# Patient Record
Sex: MALE | URBAN - METROPOLITAN AREA
[De-identification: names, ages, dates, MRNs, and addresses within clinical notes are randomized per-mention and may not be internally consistent; named-entity substitution may affect disease eponyms.]

---

## 2020-12-28 ENCOUNTER — TELEPHONE (OUTPATIENT)
Dept: OTOLARYNGOLOGY | Facility: CLINIC | Age: 40
End: 2020-12-28

## 2020-12-28 ENCOUNTER — OFFICE VISIT (OUTPATIENT)
Dept: OTOLARYNGOLOGY | Facility: CLINIC | Age: 40
End: 2020-12-28
Payer: COMMERCIAL

## 2020-12-28 VITALS
HEIGHT: 68 IN | DIASTOLIC BLOOD PRESSURE: 60 MMHG | TEMPERATURE: 97 F | BODY MASS INDEX: 19.7 KG/M2 | WEIGHT: 130 LBS | SYSTOLIC BLOOD PRESSURE: 130 MMHG

## 2020-12-28 DIAGNOSIS — H92.12 OTORRHEA OF LEFT EAR: ICD-10-CM

## 2020-12-28 DIAGNOSIS — J30.9 ALLERGIC RHINITIS, UNSPECIFIED SEASONALITY, UNSPECIFIED TRIGGER: Primary | ICD-10-CM

## 2020-12-28 DIAGNOSIS — J34.2 DEVIATED NASAL SEPTUM: ICD-10-CM

## 2020-12-28 PROCEDURE — 3008F BODY MASS INDEX DOCD: CPT | Performed by: OTOLARYNGOLOGY

## 2020-12-28 PROCEDURE — 3078F DIAST BP <80 MM HG: CPT | Performed by: OTOLARYNGOLOGY

## 2020-12-28 PROCEDURE — 3075F SYST BP GE 130 - 139MM HG: CPT | Performed by: OTOLARYNGOLOGY

## 2020-12-28 PROCEDURE — 99203 OFFICE O/P NEW LOW 30 MIN: CPT | Performed by: OTOLARYNGOLOGY

## 2020-12-28 RX ORDER — AZELASTINE 1 MG/ML
2 SPRAY, METERED NASAL 2 TIMES DAILY
Qty: 1 BOTTLE | Refills: 0 | Status: SHIPPED | OUTPATIENT
Start: 2020-12-28

## 2020-12-28 RX ORDER — MONTELUKAST SODIUM 10 MG/1
10 TABLET ORAL NIGHTLY
Qty: 30 TABLET | Refills: 3 | Status: SHIPPED | OUTPATIENT
Start: 2020-12-28

## 2020-12-28 RX ORDER — OFLOXACIN 3 MG/ML
3 SOLUTION AURICULAR (OTIC) 3 TIMES DAILY
Qty: 1 BOTTLE | Refills: 0 | Status: SHIPPED | OUTPATIENT
Start: 2020-12-28

## 2020-12-28 NOTE — PROGRESS NOTES
Ken Maya is a 36year old male. Patient presents with:  Sinus Problem: Patient Presents with stuffy nose, ear congestion of left ear       HISTORY OF PRESENT ILLNESS  Presents with a history of chronic allergy issues.   Underwent allergy testing this y bleeding and easy bruising.            PHYSICAL EXAM    /60 (BP Location: Left arm, Patient Position: Sitting, Cuff Size: adult)   Temp 97.2 °F (36.2 °C) (Tympanic)   Ht 5' 8\" (1.727 m)   Wt 130 lb (59 kg)   BMI 19.77 kg/m²        Constitutional Norm weeks.        This note was prepared using [employer] [facility] [employer] voice recognition dictation software. As a result errors may occur. When identified these errors have been corrected.  While every attempt is made to correct errors during dictation discrepancies may

## 2021-01-18 ENCOUNTER — TELEPHONE (OUTPATIENT)
Dept: OTOLARYNGOLOGY | Facility: CLINIC | Age: 41
End: 2021-01-18

## 2021-01-18 NOTE — TELEPHONE ENCOUNTER
, pt seen on 12/28 for otorrhea of left ear, per pt he did not start ear drops until January 8, per pt states ear is feeling better no more drainage or pain.  Pt was asking how he should be on ear drops, advised per your note that pt to use for 2 w

## 2021-01-18 NOTE — TELEPHONE ENCOUNTER
Pt calling asking how long does he need to take medication please advise         ofloxacin 0.3 % Otic Solution 1 Bottle 0 12/28/2020    Sig:   Place 3 drops into the left ear 3 (three) times daily.      Route:   Left Ear     Order #:   [id number]

## 2021-01-18 NOTE — TELEPHONE ENCOUNTER
Use drops for 2 weeks . RTC in 3 weeks if he wants to be seen again. External ear infection. Allergies most likely cause of his middle ear issues. I would recommend that he RTC as I recommended to discuss management options.